# Patient Record
Sex: FEMALE | Race: WHITE
[De-identification: names, ages, dates, MRNs, and addresses within clinical notes are randomized per-mention and may not be internally consistent; named-entity substitution may affect disease eponyms.]

---

## 2018-01-15 ENCOUNTER — HOSPITAL ENCOUNTER (OUTPATIENT)
Dept: HOSPITAL 45 - C.RAD | Age: 38
Discharge: HOME | End: 2018-01-15
Attending: FAMILY MEDICINE
Payer: COMMERCIAL

## 2018-01-15 DIAGNOSIS — R13.10: Primary | ICD-10-CM

## 2018-01-15 NOTE — DIAGNOSTIC IMAGING REPORT
DOUBLE CONTRAST BARIUM ESOPHAGRAM



CLINICAL HISTORY:  Dysphagia.



COMPARISON STUDY:  No priors.



TECHNIQUE: A standard air contrast barium esophagram is performed. Multiple spot

images of the esophagus are acquired both upright and prone.



FINDINGS: The patient swallowed barium and the barium pill without difficulty.

The mucosal pattern is normal. There is no evidence of intrinsic or extrinsic

mass lesion. No aspiration was seen.  The gastroesophageal junction distended

normally. No gastroesophageal reflux could be elicited by having the patient

perform the Valsalva maneuver.



Fluoroscopy time:  1.0 minutes.



Fluoroscopic images: 19



IMPRESSION: Normal barium esophagram







Electronically signed by:  Evin Davila M.D.

1/15/2018 9:45 AM



Dictated Date/Time:  1/15/2018 9:44 AM

## 2018-01-29 ENCOUNTER — HOSPITAL ENCOUNTER (OUTPATIENT)
Dept: HOSPITAL 45 - C.GI | Age: 38
Discharge: HOME | End: 2018-01-29
Attending: INTERNAL MEDICINE
Payer: COMMERCIAL

## 2018-01-29 VITALS — SYSTOLIC BLOOD PRESSURE: 121 MMHG | HEART RATE: 72 BPM | OXYGEN SATURATION: 100 % | DIASTOLIC BLOOD PRESSURE: 74 MMHG

## 2018-01-29 VITALS
WEIGHT: 163.34 LBS | HEIGHT: 64.02 IN | HEIGHT: 64.02 IN | BODY MASS INDEX: 27.89 KG/M2 | BODY MASS INDEX: 27.89 KG/M2 | WEIGHT: 163.34 LBS

## 2018-01-29 DIAGNOSIS — R13.10: Primary | ICD-10-CM

## 2018-01-29 DIAGNOSIS — Z98.890: ICD-10-CM

## 2018-01-29 DIAGNOSIS — K21.9: ICD-10-CM

## 2018-01-29 DIAGNOSIS — Z79.3: ICD-10-CM

## 2018-01-29 NOTE — ANESTHESIOLOGY PROGRESS NOTE
Anesthesia Post Op Note


Date & Time


Jan 29, 2018 at 16:10





Vital Signs


Pain Intensity:  0





Vital Signs Past 12 Hours








  Date Time  Temp Pulse Resp B/P (MAP) Pulse Ox O2 Delivery O2 Flow Rate FiO2


 


1/29/18 16:00  63 16 104/62 (76) 97 Room Air  


 


1/29/18 14:54 36.8 66 16 123/88 (100) 100 Room Air  











Notes


Mental Status:  alert / awake / arousable, participated in evaluation


Pt Amnestic to Procedure:  Yes


Nausea / Vomiting:  adequately controlled


Pain:  adequately controlled


Airway Patency, RR, SpO2:  stable & adequate


BP & HR:  stable & adequate


Hydration State:  stable & adequate


Anesthetic Complications:  no major complications apparent

## 2018-01-29 NOTE — ENDO HISTORY AND PHYSICAL
History & Physical


Date of Service:


2018.


Chief Complaint:


Reflux, Dysphagia


Referring Physician:


Shoshana


History of Present Illness


38 yo CF who presents for EGD secondary to GERD and Dysphagia.





Past Surgical History


Hx Cardiac Surgery:  No


Hx Internal Defibrillator:  No


Hx Pacemaker:  No


Hx Abdominal Surgery:  Yes (OVARIAN CYSTS REMOVED,  X 2)


Hx of Implantable Prosthesis:  No


Hx Post-Op Nausea and Vomiting:  No


Hx Cancer Surgery:  No


Hx Thoracic Surgery:  No


Hx Orthopedic:  No


Hx Urinary Tract Surgery:  No





Family History


IBD





Social History


Smoking Status:  Never Smoker


Hx Substance Use:  No


Hx Alcohol Use:  Yes (RARELY)





Allergies


Coded Allergies:  


     NO KNOWN DRUG ALLERGIES (Verified  Allergy, Unknown, ., 18)





Current Medications





Reported Home Medications








 Medications  Dose


 Route/Sig


 Max Daily Dose Days Date Category


 


 Calcium + D


  (Calcium


 Carbonate-Vitamin


 D) 1 Tab Tab  1 Tab


 PO QAM


    18 Reported


 


 Super B Complex


 Maxi (B-Complex


 W/ Folic Acid) 1


 Tab Tab  1 Tab


 PO QAM


    18 Reported


 


 Omega-3 (Fish


 Oil) 1 Ea Cap  1 Cap


 PO QAM


    18 Reported


 


 Multivitamin


  (Multivitamins)


 Tab  1 Tab


 PO QAM


    18 Reported


 


 Birth Control


 Pills


  (Miscellaneous)


 Tab  1 Tab


 PO QAM


    18 Reported











Vital Signs


Weight (Kilograms):  74.09


Height (Feet):  5


Height (Inches):  4











  Date Time  Temp Pulse Resp B/P (MAP) Pulse Ox O2 Delivery O2 Flow Rate FiO2


 


18 14:54 36.8 66 16 123/88 (100) 100 Room Air  











Physical Exam


General Appearance:  WD/WN, no apparent distress


Respiratory/Chest:  


   Auscultation:  breath sounds normal


Cardiovascular:  


   Heart Auscultation:  RRR


Abdomen:  


   Bowel Sounds:  normal


   Inspection & Palpation:  soft, non-distended, no tenderness, guarding & 

rebound





Assessment and Plan


Assessment:


38 yo CF who presents for EGD secondary to GERD and Dysphagia.








Plan:


Proceed with EGD.

## 2018-01-29 NOTE — GI REPORT
Procedure Date: 1/29/2018 3:05 PM

Procedure:            Upper GI endoscopy

Indications:          Dysphagia

Medicines:            Monitored Anesthesia Care

Complications:        No immediate complications.

Estimated Blood Loss: Estimated blood loss: none.

Procedure:            Pre-Anesthesia Assessment:

                      - Prior to the procedure, a History and Physical was 

                      performed, and patient medications and allergies were 

                      reviewed. The patient's tolerance of previous 

                      anesthesia was also reviewed. The risks and benefits of 

                      the procedure and the sedation options and risks were 

                      discussed with the patient. All questions were 

                      answered, and informed consent was obtained. Prior 

                      Anticoagulants: The patient has taken no previous 

                      anticoagulant or antiplatelet agents. ASA Grade 

                      Assessment: II - A patient with mild systemic disease. 

                      After reviewing the risks and benefits, the patient was 

                      deemed in satisfactory condition to undergo the 

                      procedure.

                      After obtaining informed consent, the endoscope was 

                      passed under direct vision. Throughout the procedure, 

                      the patient's blood pressure, pulse, and oxygen 

                      saturations were monitored continuously. The scope was 

                      introduced through the mouth, and advanced to the 

                      second part of duodenum. The upper GI endoscopy was 

                      accomplished without difficulty. The patient tolerated 

                      the procedure well.

Findings:

     The examined esophagus was normal. Biopsies were obtained in the middle 

     third of the esophagus with cold forceps for histology.

     The stomach was normal.

     The examined duodenum was normal.

Impression:           - Normal esophagus.

                      - Normal stomach.

                      - Normal examined duodenum.

                      - Biopsies were obtained in the middle third of the 

                      esophagus.

Recommendation:       - Resume previous diet.

                      - Continue present medications.

                      - Await pathology results.

                      - Return to GI office as previously scheduled.

Jordy Carlson DO

1/29/2018 4:18:08 PM

This report has been signed electronically.

Note Initiated On: 1/29/2018 3:05 PM

     I attest to the content of the Intraoperative Record and orders 

     documented therein, exceptions below

## 2018-01-29 NOTE — DISCHARGE INSTRUCTIONS
Endoscopy Patient Instructions


Date / Procedure(s) Performed


Jan 29, 2018.


EGD





Allergy Information


Coded Allergies:  


     NO KNOWN DRUG ALLERGIES (Verified  Allergy, Unknown, ., 1/23/18)





Discharge Date / Findings


Jan 29, 2018.


Mid-esophageal biopsies





Medication Instructions


OK to resume all medications today as prescribed





Reported Home Medications








 Medications  Dose


 Route/Sig


 Max Daily Dose Days Date Category


 


 Calcium + D


  (Calcium


 Carbonate-Vitamin


 D) 1 Tab Tab  1 Tab


 PO QAM


    1/23/18 Reported


 


 Super B Complex


 Maxi (B-Complex


 W/ Folic Acid) 1


 Tab Tab  1 Tab


 PO QAM


    1/23/18 Reported


 


 Omega-3 (Fish


 Oil) 1 Ea Cap  1 Cap


 PO QAM


    1/23/18 Reported


 


 Multivitamin


  (Multivitamins)


 Tab  1 Tab


 PO QAM


    1/23/18 Reported


 


 Birth Control


 Pills


  (Miscellaneous)


 Tab  1 Tab


 PO QAM


    1/23/18 Reported











Provider Instructions





Activity Restrictions





-  No exercising or heavy lifting for 24 hours. 


-  Do not drink alcohol the day of the procedure.


-  Do not drive a car or operate machinery until the day after the procedure.


-  Do not make any important decisions or sign important papers in 24 hours 

after the procedure.





Following Day:





-  Return to full activity which may include returning to work/school.





Diet





Start your diet with liquids and light foods (jello, soup, juice, toast).  Then 

eat your usual diet if not nauseated.





Treatment For Common After Affects





For mild abdominal pain, bloating, or excessive gas:





-  Rest


-  Eat lightly


-  Lie on right side





Follow-Up Information


Follow-up with Shoshana as scheduled





Anesthesia Information





What You Should Know





You have had a procedure that required some medicine to reduce anxiety and 

discomfort. This treatment is called moderate sedation.  


After receiving the treatment, you may be sleepy, but you will be able to 

breathe on your own.  The effects of the treatment may last for several hours.








Follow these instructions along with Activity/Diet recommendations noted above:





*  Do NOT do anything where dizziness or clumsiness would be dangerous.





*  Rest quietly at home today, then you can be up and about tomorrow.





*  Have a responsible person stay with you the rest of today.





*  You may have had an I.V. today.  If so, you may take the dressing off later 

today.





Recommendations


 


Call your doctor if:





*  Trouble breathing 





*  Continuous vomiting for more than 24 hours








*  Temperature above 101 degrees





*  Severe abdominal pain or bloating





*  Pain not relieved by pain medicine ordered





*  There is increased drainage or redness from any incision





*  A large amount of rectal bleeding greater than 2-3 tablespoons. 


   (If you had a polyp/s removed or have hemorrhoids, a small amount of blood -


    from the rectum is to be expected.)





*  You have any unanswered questions or concerns.








IN THE EVENT OF A SERIOUS EMERGENCY, GO TO THE NEAREST EMERGENCY ROOM








       Your discharge instructions were prepared by provider Jordy Carlson.





 Patient Instructions Signature Page














Ella Guerrero 











Patient (or Guardian) Signature/Date:____________________________________ I 

have read and understand the instructions given to me by my caregivers.








Caregiver/RN/Doctor Signature/Date:____________________________________











The above-named patient and/or guardian has received patient instructions on 

this date.





























+  Original Patient Signature Page (only) stays with chart.  Please make copy 

for patient.